# Patient Record
(demographics unavailable — no encounter records)

---

## 2025-07-25 NOTE — DISCUSSION/SUMMARY
[FreeTextEntry1] : - Discussed polycystic ovarian syndrome as constellation of symptoms with risks for endometrial hyperplasia, endometrial cancer, hyperlipidemia, diabetes, obesity and infertility.  Discussed treatment including weight loss and hormonal control.  Options such as combined estrogen-progestin discussed for cyclic endometrial shedding, progestin only hormonal control such as Depo-Provera and Levonorgestrel-IUD, and cyclic Provera discussed.  Discussed glycemic control with weight loss, nutrition and metformin.  Patient to decide. - STI testing offered; declined serology - PCOS panel sent - Referral for Pelvic Sono   PHQ9 Screenin min RTO for annual or prn

## 2025-07-25 NOTE — REVIEW OF SYSTEMS
[Patient Intake Form Reviewed] : Patient intake form was reviewed [Negative] : Heme/Lymph [Fatigue] : fatigue [FreeTextEntry8] : irregular periods

## 2025-07-25 NOTE — HISTORY OF PRESENT ILLNESS
[Currently Active] : currently active [Men] : men [Vaginal] : vaginal [No] : No [Yes] : Yes [Condoms] : Condoms [Patient refuses STI testing] : Patient refuses STI testing [FreeTextEntry1] : 19 year old nullip LMP 1 week ago presents for establishment of care and annual exam. Pt reports experiencing irregular periods since menarche.  Recently menses skipped 2 months.

## 2025-07-25 NOTE — END OF VISIT
[FreeTextEntry3] : I, Erica Babatunde, acted as a scribe on behalf of Dr. Judson Dejesus M.D., on 07/25/2025.   All medical entries made by the scribe were at my, Dr. Judson Dejesus M.D., direction and personally dictated by me on 07/25/2025. I have reviewed the chart and agree that the record accurately reflects my personal performance of the history, physical exam, assessment and plan. I have also personally directed, reviewed, and agreed with the chart.

## 2025-07-25 NOTE — PHYSICAL EXAM
[Appropriately responsive] : appropriately responsive [Alert] : alert [No Acute Distress] : no acute distress [Soft] : soft [Non-tender] : non-tender [Non-distended] : non-distended [No HSM] : No HSM [No Lesions] : no lesions [No Mass] : no mass [Oriented x3] : oriented x3 [Examination Of The Breasts] : a normal appearance [No Discharge] : no discharge [No Masses] : no breast masses were palpable [MA] : MA [Labia Majora] : normal [Labia Minora] : normal [Normal] : normal [Uterine Adnexae] : normal [FreeTextEntry2] : LEONARDO Gould